# Patient Record
Sex: FEMALE | Race: WHITE | NOT HISPANIC OR LATINO | URBAN - METROPOLITAN AREA
[De-identification: names, ages, dates, MRNs, and addresses within clinical notes are randomized per-mention and may not be internally consistent; named-entity substitution may affect disease eponyms.]

---

## 2018-05-17 ENCOUNTER — APPOINTMENT (RX ONLY)
Dept: URBAN - METROPOLITAN AREA CLINIC 91 | Facility: CLINIC | Age: 23
Setting detail: DERMATOLOGY
End: 2018-05-17

## 2018-05-17 DIAGNOSIS — L70.0 ACNE VULGARIS: ICD-10-CM

## 2018-05-17 DIAGNOSIS — L72.8 OTHER FOLLICULAR CYSTS OF THE SKIN AND SUBCUTANEOUS TISSUE: ICD-10-CM

## 2018-05-17 PROBLEM — D48.5 NEOPLASM OF UNCERTAIN BEHAVIOR OF SKIN: Status: ACTIVE | Noted: 2018-05-17

## 2018-05-17 PROCEDURE — 99213 OFFICE O/P EST LOW 20 MIN: CPT

## 2018-05-17 PROCEDURE — ? COUNSELING

## 2018-05-17 PROCEDURE — ? DEFER

## 2018-05-17 PROCEDURE — ? DIAGNOSIS COMMENT

## 2018-05-17 PROCEDURE — ? PRESCRIPTION

## 2018-05-17 RX ORDER — TRETINOIN 0.5 MG/G
CREAM TOPICAL
Qty: 1 | Refills: 11 | Status: ERX | COMMUNITY
Start: 2018-05-17

## 2018-05-17 RX ORDER — ERYTHROMYCIN AND BENZOYL PEROXIDE 3 %-5 %
KIT TOPICAL
Qty: 1 | Refills: 12 | Status: ERX

## 2018-05-17 RX ADMIN — TRETINOIN: 0.5 CREAM TOPICAL at 12:13

## 2018-05-17 ASSESSMENT — LOCATION ZONE DERM
LOCATION ZONE: SCALP
LOCATION ZONE: FACE

## 2018-05-17 ASSESSMENT — LOCATION SIMPLE DESCRIPTION DERM
LOCATION SIMPLE: LEFT CHEEK
LOCATION SIMPLE: SCALP

## 2018-05-17 ASSESSMENT — LOCATION DETAILED DESCRIPTION DERM
LOCATION DETAILED: RIGHT CENTRAL PARIETAL SCALP
LOCATION DETAILED: LEFT INFERIOR CENTRAL MALAR CHEEK

## 2018-05-17 NOTE — PROCEDURE: DEFER
Detail Level: Detailed
Scheduling Instructions (Optional): will need to be surgically excised
Instructions (Optional): Patient currently living in [place] and will see a dermatologist or surgeon there

## 2018-05-17 NOTE — PROCEDURE: DIAGNOSIS COMMENT
Comment: An atypical lesion with overlying alopecia. Understands concern. Will see physician in [place] for biopsy.
Detail Level: Simple

## 2019-12-26 ENCOUNTER — APPOINTMENT (RX ONLY)
Dept: URBAN - METROPOLITAN AREA CLINIC 91 | Facility: CLINIC | Age: 24
Setting detail: DERMATOLOGY
End: 2019-12-26

## 2019-12-26 DIAGNOSIS — L65.9 NONSCARRING HAIR LOSS, UNSPECIFIED: ICD-10-CM

## 2019-12-26 PROCEDURE — ? TREATMENT REGIMEN

## 2019-12-26 PROCEDURE — ? DIAGNOSIS COMMENT

## 2019-12-26 PROCEDURE — ? COUNSELING

## 2019-12-26 PROCEDURE — 99212 OFFICE O/P EST SF 10 MIN: CPT

## 2019-12-26 ASSESSMENT — LOCATION SIMPLE DESCRIPTION DERM
LOCATION SIMPLE: RIGHT FOREHEAD
LOCATION SIMPLE: LEFT FOREHEAD

## 2019-12-26 ASSESSMENT — LOCATION DETAILED DESCRIPTION DERM
LOCATION DETAILED: RIGHT FOREHEAD
LOCATION DETAILED: LEFT FOREHEAD

## 2019-12-26 ASSESSMENT — LOCATION ZONE DERM: LOCATION ZONE: FACE

## 2019-12-26 NOTE — PROCEDURE: TREATMENT REGIMEN
Detail Level: Zone
Continue Regimen: Biotin vits
Initiate Treatment: Rogaine foam over the counter once a day
Plan: Pt will follow up in 3 months with doctor robert Parr if no improvement

## 2019-12-26 NOTE — HPI: OTHER
Condition:: Hair loss
Please Describe Your Condition:: Pt comes  in for a chief complaint of Hair loss . Pt states it is more at her hairline and it started 1 week ago. Pt has been taking the hair skin and nails biotin vits over the counter. Pt denies any hospitalization or illnesses and no thyroid issues and normal menstral cycles. Pt states she went to a surgeon to have the bump on her scalp looked at in town and he told her it was benign.

## 2019-12-26 NOTE — PROCEDURE: DIAGNOSIS COMMENT
Comment: Extended discussion with patient regarding concerns. Notes only started 1.5 weeks ago and now she states seems to be improving. Menstrual cycles regular, no other symptoms, suggestive of hormone irregularity. She will see her primary in [place] for work up.
Detail Level: Simple